# Patient Record
Sex: FEMALE
[De-identification: names, ages, dates, MRNs, and addresses within clinical notes are randomized per-mention and may not be internally consistent; named-entity substitution may affect disease eponyms.]

---

## 2022-05-04 PROBLEM — Z00.00 ENCOUNTER FOR PREVENTIVE HEALTH EXAMINATION: Status: ACTIVE | Noted: 2022-05-04

## 2022-05-26 ENCOUNTER — NON-APPOINTMENT (OUTPATIENT)
Age: 62
End: 2022-05-26

## 2022-05-26 ENCOUNTER — APPOINTMENT (OUTPATIENT)
Dept: OPHTHALMOLOGY | Facility: CLINIC | Age: 62
End: 2022-05-26
Payer: COMMERCIAL

## 2022-05-26 PROCEDURE — 92004 COMPRE OPH EXAM NEW PT 1/>: CPT

## 2022-05-26 PROCEDURE — 92136 OPHTHALMIC BIOMETRY: CPT

## 2022-05-26 PROCEDURE — 92133 CPTRZD OPH DX IMG PST SGM ON: CPT

## 2022-06-09 ENCOUNTER — APPOINTMENT (OUTPATIENT)
Dept: OTOLARYNGOLOGY | Facility: CLINIC | Age: 62
End: 2022-06-09
Payer: COMMERCIAL

## 2022-06-09 VITALS
HEART RATE: 78 BPM | HEIGHT: 66 IN | WEIGHT: 147 LBS | SYSTOLIC BLOOD PRESSURE: 108 MMHG | DIASTOLIC BLOOD PRESSURE: 73 MMHG | BODY MASS INDEX: 23.63 KG/M2 | TEMPERATURE: 98 F

## 2022-06-09 DIAGNOSIS — Z78.9 OTHER SPECIFIED HEALTH STATUS: ICD-10-CM

## 2022-06-09 PROCEDURE — 31231 NASAL ENDOSCOPY DX: CPT

## 2022-06-09 PROCEDURE — 92557 COMPREHENSIVE HEARING TEST: CPT

## 2022-06-09 PROCEDURE — 92550 TYMPANOMETRY & REFLEX THRESH: CPT

## 2022-06-09 PROCEDURE — 99203 OFFICE O/P NEW LOW 30 MIN: CPT | Mod: 25

## 2022-06-10 PROBLEM — Z78.9 NON-SMOKER: Status: ACTIVE | Noted: 2022-06-09

## 2022-06-10 RX ORDER — LEVOTHYROXINE SODIUM 100 UG/1
100 TABLET ORAL
Refills: 0 | Status: ACTIVE | COMMUNITY

## 2022-06-10 RX ORDER — FEXOFENADINE HCL 60 MG
TABLET ORAL
Refills: 0 | Status: ACTIVE | COMMUNITY

## 2022-06-10 NOTE — PHYSICAL EXAM
[de-identified] : soft flat [de-identified] : EAC clear, TM intact, ME clear [Nasal Endoscopy Performed] : nasal endoscopy was performed, see procedure section for findings [Midline] : trachea located in midline position [de-identified] : posterior opx clear [Normal] : no rashes

## 2022-06-10 NOTE — CONSULT LETTER
[Dear  ___] : Dear  [unfilled], [Courtesy Letter:] : I had the pleasure of seeing your patient, [unfilled], in my office today. [Consult Closing:] : Thank you very much for allowing me to participate in the care of this patient.  If you have any questions, please do not hesitate to contact me. [Sincerely,] : Sincerely, [FreeTextEntry3] : Michele Alcaraz MD\par Department of Otolaryngology - Head and Neck Surgery\par [facility]

## 2022-06-10 NOTE — PROCEDURE
[FreeTextEntry3] : Nasal Endoscopy:\par rightward septal deflection\par inferior turbinate hypertrophy\par no mucopus or polyps\par clear postnasal mucus\par

## 2022-06-10 NOTE — ASSESSMENT
[FreeTextEntry1] : 62F here for initial evaluation. She has suffered from long standing constellation of otolaryngologic sx. This includes robust postnasal drip, thick mucus, nasal congestion/obstruction. Sometimes the postnasal drip can be so extreme she feels fluid draining 'in her chest' which can lead to anxiety and difficulty breathing. She also has ear discomfort/popping much worse when flying. She reports getting upwards of 4-5 'sinus infections' every year; in fact, ever since her covid infection 1/2022 she has been on abx three times for presumed sinus infections. These infections usually present as frontal pain/pressure which then leads into thick mucus and postnasal drip. Her prior ENT gave her prednisone which she would take occasionally (15mg daily) to help with her sx to avoid need for abx. She has mild seasonal allergies. She uses saline, flonase and netipot mostly regularly. She also takes allegra+/-D with some regularity. There is no asthma. There is rare reflux and she takes nexium/pepcid occasionally.\par Audiogram from show mild right sided high frequency SNHL with excellent discrimination scores and is otherwise unremarkable. On exam, nasal endoscopy shows rightward septal deflection and inferior turbinate hypertrophy with mild clear postnasal mucus. the rest of the head and neck exam is unremarkable.\par Given history, I would favor recurrent acute sinusitis and not that of a chronic picture, as she feels well in between episodes of worsening of sx. There is also no endoscopic e/o sinusitis on exam today with no mucopurulence or polyps. There is, however, likely a chronic rhinitis as well on exam with mild postnasal drainage, making recurrent acute episodes worse. Next step is getting CT for baseline; she has not had any imaging and has had sx for years with 4-5 infections per yr. This will determine if there is baseline mucosal disease (which I doubt). I will speak w pt thereafter to review imaging. Second, she needs to see me in office when acutely symptomatic for exam to determine if sx are ailyn jenn sinusitis or just severe rhinitis. This was all discussed at length and all questions answered.

## 2022-06-10 NOTE — HISTORY OF PRESENT ILLNESS
[de-identified] : 62F here for initial evaluation.\par \par She has suffered from long standing constellation of otolaryngologic sx. This includes robust postnasal drip, thick mucus, nasal congestion/obstruction. Sometimes the postnasal drip can be so extreme she feels fluid draining 'in her chest' which can lead to anxiety and difficulty breathing. She also has ear discomfort/popping much worse when flying.\par She reports getting upwards of 4-5 sinus infections every year; in fact, ever since her covid infection 1/2022 she has been on abx three times for presumed sinus infections. These infections usually present as frontal pain/pressure which then leads into thick mucus and postnasal drip. Her prior ENT gave her prednisone which she would take occasionally (15mg daily) to help with her sx to avoid abx.\par She has mild seasonal allergies. She uses saline, flonase and netipot mostly regularly. She also takes allegra+/-D with some regularity.\par There is no asthma. There is rare reflux and she takes nexium/pepcid occasionally.\par No imaging.\par \par Audiogram from today:\par R ear: normal hearing sloping to mild SNHL. SRT 15, 100%, type Ad\par L ear: normal hearing. SRT 5, 100% discrim, type As\par \par ROS otherwise unremarkable.

## 2022-09-30 ENCOUNTER — APPOINTMENT (OUTPATIENT)
Dept: OTOLARYNGOLOGY | Facility: CLINIC | Age: 62
End: 2022-09-30

## 2022-09-30 VITALS
HEART RATE: 71 BPM | BODY MASS INDEX: 23.63 KG/M2 | DIASTOLIC BLOOD PRESSURE: 74 MMHG | SYSTOLIC BLOOD PRESSURE: 114 MMHG | HEIGHT: 66 IN | WEIGHT: 147 LBS

## 2022-09-30 PROCEDURE — 31231 NASAL ENDOSCOPY DX: CPT

## 2022-09-30 PROCEDURE — 99214 OFFICE O/P EST MOD 30 MIN: CPT | Mod: 25

## 2022-10-03 RX ORDER — FLUTICASONE PROPIONATE 93 UG/1
93 SPRAY, METERED NASAL
Qty: 1 | Refills: 3 | Status: ACTIVE | COMMUNITY
Start: 2022-10-03 | End: 1900-01-01

## 2022-10-03 NOTE — HISTORY OF PRESENT ILLNESS
[de-identified] : 62F here in followup.\par \par She is here to review imaging.\par Last month she developed flu-like/URI sx (headache, frontal pressure) and took 2-3 days of low dose prednisone, saline, nasacort, mucinex, (15mg) and felt better.\par This occurs year round where sx typically last 1-7days and resolve, or linger and become a bronchitis w coughing. \par \par From prior note:\par She has suffered from long standing constellation of otolaryngologic sx. This includes robust postnasal drip, thick mucus, nasal congestion/obstruction. Sometimes the postnasal drip can be so extreme she feels fluid draining 'in her chest' which can lead to anxiety and difficulty breathing. She also has ear discomfort/popping much worse when flying.\par She reports getting upwards of 4-5 sinus infections every year; in fact, ever since her covid infection 1/2022 she has been on abx three times for presumed sinus infections. These infections usually present as frontal pain/pressure which then leads into thick mucus and postnasal drip. Her prior ENT gave her prednisone which she would take occasionally (15mg daily) to help with her sx to avoid abx. She was also told in past she has adenoid tissue which predisposes her to infection.\par She has mild seasonal allergies, but never tested. She uses saline, steroid sprays and netipot mostly regularly. She also takes allegra+/-D with some regularity.\par There is no asthma. There is rare reflux and she takes nexium/pepcid occasionally.\par \par CT Sinus 8/17/22 (I reviewed images):\par -mild bilateral septal deviation\par -hypertrophic inferior turbinates, left>right\par -paranasal sinuses clear, no mucosal disease\par \par ROS otherwise unremarkable.

## 2022-10-03 NOTE — DATA REVIEWED
[de-identified] : Audiogram from 6/9/22:\par R ear: normal hearing sloping to mild SNHL. SRT 15, 100%, type Ad\par L ear: normal hearing. SRT 5, 100% discrim, type As [de-identified] : CT Sinus 8/17/22:\par FINDINGS:  \par \par Nasal Cavity:\par Bony nasal septum deviates rightward, cartilaginous septum better evaluated directly. Prominent bilateral nasal turbinate hypertrophy, most severe left inferior turbinate. Narrowed airways. Emma unique, cribriform plate and tegmen ethmoidalis are intact.\par \par Frontal Sinuses/ Frontal Sinus Outflow:\par Hypoplastic. Bilaterally ventilated agger nasi cells. Mucosal thickening lines and contributes to narrowing of the frontoethmoidal recesses.\par \par Ethmoid Sinuses:\par Clear. Intact septa.\par \par Maxillary Sinuses and Ostiomeatal Units:\par 2 adjacent polyps/cysts contained within a partially septated anterior left compartment, larger 10 mm. Left-sided mucosal thickening, large ethmoid bullae and hypertrophy of underlying normal structures results in narrowing of the ostiomeatal units, worse on the left. No CT evidence of periodontal disease affecting the sinuses.\par \par Sphenoid Sinuses/ Outflow:\par Clear. Narrowed left and patent right sphenoethmoidal recesses.\par \par Nonrelated Findings:\par Clear tympanomastoid cavities. No orbital or ocular mass. Intact lamina papyracea. Normal contour posterior wall of the nasopharynx. Dense material within the superficial lobe right parotid gland and overlying versus in the superficial lobe of the left parotid gland, these could represent sequela facial injections rather than unrelated calcifications; mild right-sided worsening. There is infiltration of the facial fat diffusely, more extensive on the right, consistent with cosmetic injections.\par \par IMPRESSION:  \par 1. Bony nasal septum deviates rightward, cartilaginous septum better evaluated directly. Prominent bilateral nasal turbinate hypertrophy. Significantly narrowed airways.\par 2. Chronic left maxillary sinusitis without significant interval change, polyps/cysts larger measuring up to 10 mm. Narrowed left more severe than right ostiomeatal units. Worsening narrowing of the left sphenoethmoidal recess. Interval narrowing of the frontoethmoidal recesses.\par 3. Correlate for cosmetic injections, findings detailed above.\par

## 2022-10-03 NOTE — PHYSICAL EXAM
[Nasal Endoscopy Performed] : nasal endoscopy was performed, see procedure section for findings [Midline] : trachea located in midline position [Normal] : no rashes [de-identified] : soft flat [de-identified] : EAC clear, TM intact, ME clear [de-identified] : posterior opx clear

## 2022-10-03 NOTE — ASSESSMENT
[FreeTextEntry1] : 62F here in followup. Last month she developed flu-like/URI sx (headache, frontal pressure) and took 2-3 days of low dose prednisone, saline, nasacort, mucinex, (15mg) and felt better. This occurs year round where sx typically last 1-7days and resolve, or linger and become a bronchitis w coughing. \par She has suffered from long standing constellation of otolaryngologic sx. This includes robust postnasal drip, thick mucus, nasal congestion/obstruction. Sometimes the postnasal drip can be so extreme she feels fluid draining 'in her chest' which can lead to anxiety and difficulty breathing. She also has ear discomfort/popping much worse when flying. She reports getting upwards of 4-5 'sinus infections' every year; in fact, ever since her covid infection 1/2022 she has been on abx three times for presumed sinus infections. These infections usually present as frontal pain/pressure which then leads into thick mucus and postnasal drip. Her prior ENT gave her prednisone which she would take occasionally (15mg daily) to help with her sx to avoid need for abx. She has mild seasonal allergies. She uses saline, flonase and netipot mostly regularly. She was also told in past she has adenoid tissue which predisposes her to infection. She also takes allegra+/-D with some regularity. There is no asthma. There is rare reflux and she takes nexium/pepcid occasionally. CT sinus demonstrates mild bilateral septal deviation with moderately severe inferior turbinate hypertrophy; paranasal sinuses are clear w no mucosal disease.\par Complete and comprehensive head and neck exam, including nasal endoscopy, is grossly unremarkable, with mild rightward septal deflection and inferior turbinate hypertrophy.\par Given history and imaging, she suffers from recurrent acute sinusitis; this is not a chronic picture, as there is also no endoscopic e/o sinusitis on exam today, nor on imaging either. with no mucopurulence or polyps. There is, however, a chronic rhinitis on exam and imaging, making recurrent acute episodes worse. This is worsened by reflux and perhaps allergies? Next step is trying to figure out triggers and controlling all variables to prevent exacerbations. Tight reflux control w diet/lifestyle and meds as needed. To see allergist for completeness. Will start on xhance since better than flonase. Lastly, she needs to see me when acutely sick for exam to determine if sx are ailyn jenn sinusitis or just severe rhinitis. This was all discussed at length and all questions answered.

## 2022-10-03 NOTE — PROCEDURE
[FreeTextEntry3] : Nasal Endoscopy:\par mild rightward septal deflection\par inferior turbinate hypertrophy\par no mucopus or polyps\par no adenoid tissue, nasopharynx wnl

## 2022-10-03 NOTE — CONSULT LETTER
[Dear  ___] : Dear  [unfilled], [Courtesy Letter:] : I had the pleasure of seeing your patient, [unfilled], in my office today. [Consult Closing:] : Thank you very much for allowing me to participate in the care of this patient.  If you have any questions, please do not hesitate to contact me. [Sincerely,] : Sincerely, [FreeTextEntry3] : Michele Alcaraz MD\par Department of Otolaryngology - Head and Neck Surgery\par [facility]

## 2023-10-31 ENCOUNTER — APPOINTMENT (OUTPATIENT)
Dept: OTOLARYNGOLOGY | Facility: CLINIC | Age: 63
End: 2023-10-31
Payer: COMMERCIAL

## 2023-10-31 DIAGNOSIS — J32.9 CHRONIC SINUSITIS, UNSPECIFIED: ICD-10-CM

## 2023-10-31 DIAGNOSIS — J34.3 HYPERTROPHY OF NASAL TURBINATES: ICD-10-CM

## 2023-10-31 PROCEDURE — 31231 NASAL ENDOSCOPY DX: CPT | Mod: 52

## 2023-10-31 PROCEDURE — 99214 OFFICE O/P EST MOD 30 MIN: CPT | Mod: 25

## 2023-10-31 RX ORDER — AZELASTINE HYDROCHLORIDE 137 UG/1
137 SPRAY, METERED NASAL DAILY
Qty: 1 | Refills: 5 | Status: ACTIVE | COMMUNITY
Start: 2023-10-31 | End: 1900-01-01

## 2024-02-26 RX ORDER — PREDNISONE 10 MG/1
10 TABLET ORAL
Qty: 5 | Refills: 0 | Status: ACTIVE | COMMUNITY
Start: 2024-02-26 | End: 1900-01-01

## 2024-02-26 RX ORDER — AMOXICILLIN AND CLAVULANATE POTASSIUM 875; 125 MG/1; MG/1
875-125 TABLET, COATED ORAL
Qty: 20 | Refills: 0 | Status: ACTIVE | COMMUNITY
Start: 2023-08-11 | End: 1900-01-01

## 2024-08-27 ENCOUNTER — RX RENEWAL (OUTPATIENT)
Age: 64
End: 2024-08-27

## 2024-08-27 RX ORDER — IPRATROPIUM BROMIDE 42 UG/1
0.06 SPRAY NASAL
Qty: 15 | Refills: 0 | Status: ACTIVE | COMMUNITY
Start: 2024-08-27 | End: 1900-01-01